# Patient Record
Sex: FEMALE | Race: WHITE | URBAN - NONMETROPOLITAN AREA
[De-identification: names, ages, dates, MRNs, and addresses within clinical notes are randomized per-mention and may not be internally consistent; named-entity substitution may affect disease eponyms.]

---

## 2017-08-12 ENCOUNTER — OFFICE VISIT - GICH (OUTPATIENT)
Dept: FAMILY MEDICINE | Facility: OTHER | Age: 27
End: 2017-08-12

## 2017-08-12 ENCOUNTER — HISTORY (OUTPATIENT)
Dept: FAMILY MEDICINE | Facility: OTHER | Age: 27
End: 2017-08-12

## 2017-08-12 DIAGNOSIS — R50.9 FEVER: ICD-10-CM

## 2017-08-12 DIAGNOSIS — J02.9 ACUTE PHARYNGITIS: ICD-10-CM

## 2017-08-12 DIAGNOSIS — J02.0 STREPTOCOCCAL PHARYNGITIS: ICD-10-CM

## 2017-08-12 LAB — STREP A ANTIGEN - HISTORICAL: POSITIVE

## 2017-12-28 NOTE — PATIENT INSTRUCTIONS
Patient Information     Patient Name MRN Sex Ninfa Luz [id number] Female 1990      Patient Instructions by Carlie Laughlin NP at 2017  9:00 AM     Author:  Carlie Laughlin NP Service:  (none) Author Type:  PHYS- Nurse Practitioner     Filed:  2017 10:03 AM Encounter Date:  2017 Status:  Signed     :  Carlie Laughlin NP (PHYS- Nurse Practitioner)            Positive rapid strep    What you should do:    If you are prescribed antibiotics:    take all the medicine as directed    get a new toothbrush to start using two days after starting the antibiotics    stay home from school or work until you have been on antibiotics for at least 24 hours    Get plenty of fluids to stay well hydrated    Make sure that you get plenty of rest    Take acetaminophen (Tylenol ) or ibuprofen (Motrin , Advil ) for fever or discomfort if needed.  Follow your health care provider s or the package directions.       Eating freezer treats, such as Popsicles  and ice cream can ease sore throat pain    If you had a throat culture you should receive the results within 3 days. Please call the clinic if you have not been notified of your results after 3 days.    How will you know this plan is not working - warning signs you should watch for:    You get new symptoms you are worried about    You:  o have little energy  o are hard to wake up  o don t want to drink fluids  o have little or lack of urine    When should you be seen again?    If you have trouble swallowing your saliva, go to the Emergency Room right away    If you have any of the symptoms listed, above return right away    If your fever or throat pain does not improve within three days, return at that time    Who should you see if the plan is not working?    Make an appointment to see your primary care provider or clinic.      Follow up in clinic if:  You have a fever of at least 101 F or 38.4 C   Your throat pain is severe or does not  start to improve within 5 to 7 days    Call 9-1-1 or go to the emergency room if you:  Have trouble breathing   Are drooling because you cannot swallow your saliva   Have swelling of the neck or tongue   Cannot move your neck or have trouble opening your mouth

## 2017-12-28 NOTE — PROGRESS NOTES
"Patient Information     Patient Name MRN Sex Ninfa Luz [id number] Female 1990      Progress Notes by Carlie Laughlin NP at 2017  9:00 AM     Author:  Carlie Laughlin NP Service:  (none) Author Type:  PHYS- Nurse Practitioner     Filed:  2017 10:57 AM Encounter Date:  2017 Status:  Signed     :  Carlie Laughlin NP (PHYS- Nurse Practitioner)            HPI:    Ninfa Mccullough is a 27 y.o. female who presents to clinic today for strep testing.   Started 2 days ago with swollen neck glands and sore throat.  Yesterday decreased mild headache, mild nausea, generalized body aches, fatigue, fevers, and chills.   Yesterday was the worst for symptoms, today is feeling slightly better.  No runny or stuffy nose.  No cough.  No ear pain, pressure, or ringing.  Appetite decreased, minimal solids due to swelling and pain.  Drinking lots of water.  Taking Tylenol PM and Dayquil, none this morning.          Past Medical History:     Diagnosis  Date     History of cold urticaria      Nocturnal enuresis      resolved      POTS (postural orthostatic tachycardia syndrome)      Right foot sprain      Thoracic facet syndrome     thoracic facet dysfunction      Past Surgical History:      Procedure  Laterality Date     WISDOM TEETH EXTRACTION       Social History       Substance Use Topics         Smoking status:   Never Smoker     Smokeless tobacco:   Never Used     Alcohol use   Yes      Comment: occasionally      No current outpatient prescriptions on file.     No current facility-administered medications for this visit.      Medications have been reviewed by me and are current to the best of my knowledge and ability.    No Known Allergies    Past medical history, past surgical history, current medications and allergies reviewed and accurate to the best of my knowledge.        ROS:  Refer to HPI    /64  Pulse 92  Temp 100  F (37.8  C) (Tympanic)   Ht 1.727 m (5' 8\")  Wt 76.8 " kg (169 lb 6.4 oz)  LMP 08/04/2017  BMI 25.76 kg/m2    EXAM:  General Appearance: Well appearing adult female, appropriate appearance for age. No acute distress  Head: normocephalic, atraumatic  Ears: Left TM with bony landmarks appreciated, no erythema, no effusion, no bulging, no purulence.  Right TM with bony landmarks appreciated, no erythema, no effusion, no bulging, no purulence.   Left auditory canal clear.  Right auditory canal clear.  Normal external ears, non tender.  Eyes: conjunctivae normal, no drainage  Orophayrnx: moist mucous membranes, posterior pharynx with mild erythema, tonsils with 3+hypertrophy, mild erythema, scattered white exudates bilaterally, no petechiae, no post nasal drip seen.    Neck: bilateral tonsillar and cervical lymph nodes with enlargement  Respiratory: normal chest wall and respirations.  Normal effort.  Clear to auscultation bilaterally, no wheezing, crackles or rhonchi.  No increased work of breathing.  No cough appreciated  Cardiac: RRR with no murmurs  Musculoskeletal:  Normal gait.  Equal movement of bilateral upper extremities.  Equal movement of bilateral lower extremities.    Psychological: normal affect, alert and pleasant      Labs:  Results for orders placed or performed in visit on 08/12/17      RAPID STREP WITH REFLEX CULTURE      Result  Value Ref Range    STREP A ANTIGEN           Positive (A) Negative           ASSESSMENT/PLAN:    ICD-10-CM    1. Sore throat J02.9 RAPID STREP WITH REFLEX CULTURE      RAPID STREP WITH REFLEX CULTURE   2. Low grade fever R50.9    3. Strep pharyngitis J02.0 penicillin g benzathine 1,200,000 Units injection (BICILLIN-LA)      dexamethasone 10 mg inj for oral, topical or inhalation use (DECADRON)         Positive rapid strep test  Penicillin G Benzathine 1.2 million units IM injection x 1 administered in clinic, patient monitored x 15 minutes, tolerated well with no complications.  Decadron 10 mg oral x 1 administered in  clinic  New toothbrush in 24 hours   Encouraged fluids  Symptomatic treatment - salt water gargles, honey, lozenges, etc   Tylenol or ibuprofen PRN  Follow up if symptoms persist or worsen or concerns            Patient Instructions   Positive rapid strep    What you should do:    If you are prescribed antibiotics:    take all the medicine as directed    get a new toothbrush to start using two days after starting the antibiotics    stay home from school or work until you have been on antibiotics for at least 24 hours    Get plenty of fluids to stay well hydrated    Make sure that you get plenty of rest    Take acetaminophen (Tylenol ) or ibuprofen (Motrin , Advil ) for fever or discomfort if needed.  Follow your health care provider s or the package directions.       Eating freezer treats, such as Popsicles  and ice cream can ease sore throat pain    If you had a throat culture you should receive the results within 3 days. Please call the clinic if you have not been notified of your results after 3 days.    How will you know this plan is not working - warning signs you should watch for:    You get new symptoms you are worried about    You:  o have little energy  o are hard to wake up  o don t want to drink fluids  o have little or lack of urine    When should you be seen again?    If you have trouble swallowing your saliva, go to the Emergency Room right away    If you have any of the symptoms listed, above return right away    If your fever or throat pain does not improve within three days, return at that time    Who should you see if the plan is not working?    Make an appointment to see your primary care provider or clinic.      Follow up in clinic if:  You have a fever of at least 101 F or 38.4 C   Your throat pain is severe or does not start to improve within 5 to 7 days    Call 9-1-1 or go to the emergency room if you:  Have trouble breathing   Are drooling because you cannot swallow your saliva   Have  swelling of the neck or tongue   Cannot move your neck or have trouble opening your mouth

## 2017-12-30 NOTE — NURSING NOTE
Patient Information     Patient Name MRN Sex Ninfa Luz [id number] Female 1990      Nursing Note by Yanique Alcantara at 2017  9:00 AM     Author:  Yanique Alcantara Service:  (none) Author Type:  (none)     Filed:  2017  9:55 AM Encounter Date:  2017 Status:  Signed     :  Yanique Alcantara            Patient presents to clinic with sore throat  Yanique Tony ....................  2017   9:44 AM

## 2018-01-25 VITALS
BODY MASS INDEX: 25.67 KG/M2 | WEIGHT: 169.4 LBS | SYSTOLIC BLOOD PRESSURE: 110 MMHG | HEIGHT: 68 IN | TEMPERATURE: 100 F | DIASTOLIC BLOOD PRESSURE: 64 MMHG | HEART RATE: 92 BPM

## 2018-01-30 ENCOUNTER — DOCUMENTATION ONLY (OUTPATIENT)
Dept: FAMILY MEDICINE | Facility: OTHER | Age: 28
End: 2018-01-30

## 2018-01-30 PROBLEM — M41.20 SCOLIOSIS (AND KYPHOSCOLIOSIS), IDIOPATHIC: Status: ACTIVE | Noted: 2018-01-30

## 2018-01-30 PROBLEM — L25.9 CONTACT DERMATITIS AND ECZEMA: Status: ACTIVE | Noted: 2018-01-30

## 2018-01-30 PROBLEM — R55 SYNCOPE: Status: ACTIVE | Noted: 2018-01-30

## 2018-03-26 ENCOUNTER — HEALTH MAINTENANCE LETTER (OUTPATIENT)
Age: 28
End: 2018-03-26